# Patient Record
(demographics unavailable — no encounter records)

---

## 2018-09-14 NOTE — NUR
PT ARRIVED TO FLOOR AT 1455. ADMISSION INTAKE COMPLETED. VSS. PT GIVEN
POTASSIUM PO AND IV MAGNESIUM PER ORDER. PT ASSISTED TO ORDER DINNER.

## 2018-09-14 NOTE — NUR
PT CALLED FOR HEARTBURN MEDICATION. NIO-ORDER FOR MAALOX WAS PUT IN. PT STATED
0/10 PAIN. RIGHT RADIAL PULSE IS +2, EDEMA +2. PT DENIES NUMBNESS IN RIGHT ARM
AND HAND. OUTLINE OF CELLULITIS HAS BEEN TRACED WITH A SHARPY TO MONITOR
PROGRESS. NO NEW CONCERNS AT THIS TIME. PT IS TRYING TO SLEEP.

## 2018-09-14 NOTE — XMS
Clinical Summary
  Created on: 2018
 
 Spencer Brunson
 External Reference #: [id number]
 : 69
 Sex: Male
 
 Demographics
 
 
+-----------------------+----------------------+
| Address               | [address] |
|                       | [place]  [address] |
+-----------------------+----------------------+
| Home Phone            | [phone]      |
+-----------------------+----------------------+
| Preferred Language    | Unknown              |
+-----------------------+----------------------+
| Marital Status        | Single               |
+-----------------------+----------------------+
| Gnosticist Affiliation | Unknown              |
+-----------------------+----------------------+
| Race                  | Unknown              |
+-----------------------+----------------------+
| Ethnic Group          | Unknown              |
+-----------------------+----------------------+
 
 
 Author
 
 
+--------------+-----------------------+
| Author       | [facility] |
+--------------+-----------------------+
| Organization | [facility] |
+--------------+-----------------------+
| Address      | Unknown               |
+--------------+-----------------------+
| Phone        | Unavailable           |
+--------------+-----------------------+
 
 
 
 Support
 
 
+------------+--------------+---------+-----------------+
| Name       | Relationship | Address | Phone           |
+------------+--------------+---------+-----------------+
| No,Contact | ECON         | Unknown | [phone] |
+------------+--------------+---------+-----------------+
 
 
 
 Care Team Providers
 
 
 
+---------------------------+------+-----------------+
| Care Team Member Name     | Role | Phone           |
+---------------------------+------+-----------------+
| Dominique Lara PA-C | PP   | [phone] |
+---------------------------+------+-----------------+
 
 
 
 Allergies
 Not on File
 
 Current Medications
 Not on file
 
 Active Problems
 Not on file
 
 Social History
 
 
+----------------+-------+-----------+--------+------+
| Tobacco Use    | Types | Packs/Day | Years  | Date |
|                |       |           | Used   |      |
+----------------+-------+-----------+--------+------+
| Never Assessed |       |           |        |      |
+----------------+-------+-----------+--------+------+
 
 
 
+------------------+---------------+
| Sex Assigned at  | Date Recorded |
| Birth            |               |
+------------------+---------------+
| Not on file      |               |
+------------------+---------------+
 
 
 
 Plan of Treatment
 Not on file
 
 Results
 Not on filefrom Last 3 Months
 
 Insurance
 
 
+-----------------+--------+-------------+------+-------+---------+
| Payer           | Benefi | Subscriber  | Type | Phone | Address |
|                 | t Plan | ID          |      |       |         |
|                 |  /     |             |      |       |         |
|                 | Group  |             |      |       |         |
+-----------------+--------+-------------+------+-------+---------+
| [employer] | ODS    | [id number]   |      |       |         |
|                 | HEALTH |             |      |       |         |
|                 |  PLAN  |             |      |       |         |
+-----------------+--------+-------------+------+-------+---------+
 
 
 
 
+----------------+--------+-------------+--------+-------------+---------------------+
| Guarantor Name | [employer] | Relation to | Date   | Phone       | Billing Address     |
|                | t Type |  Patient    | of     |             |                     |
|                |        |             | Birth  |             |                     |
+----------------+--------+-------------+--------+-------------+---------------------+
| SPENCER BRUNSON     | Person | Self        | / |   Home:     |   [address]  |
|                | [address] |             | 1969   | [phone] | [address]  [place], [place]  |
|                | [place]    |             |        | [phone]        | [address]               |
+----------------+--------+-------------+--------+-------------+---------------------+

## 2018-09-14 NOTE — XMS
Clinical Summary
  Created on: 2018
 
 Spencer Brunson
 External Reference #: [id number]
 : 69
 Sex: Male
 
 Demographics
 
 
+-----------------------+----------------------+
| Address               | [address] |
|                       | [place]  [address] |
+-----------------------+----------------------+
| Home Phone            | [phone]      |
+-----------------------+----------------------+
| Preferred Language    | Unknown              |
+-----------------------+----------------------+
| Marital Status        | Unknown              |
+-----------------------+----------------------+
| Baptism Affiliation | Unknown              |
+-----------------------+----------------------+
| Race                  | Unknown              |
+-----------------------+----------------------+
| Ethnic Group          | Unknown              |
+-----------------------+----------------------+
 
 
 Author
 
 
+--------------+--------------------------------------------+
| Author       | [facility]  |
|              | and [place]                                |
+--------------+--------------------------------------------+
| Organization | [facility]  |
|              | and [place]                                |
+--------------+--------------------------------------------+
| Address      | Unknown                                    |
+--------------+--------------------------------------------+
| Phone        | Unavailable                                |
+--------------+--------------------------------------------+
 
 
 
 Care Team Providers
 
 
+-----------------------+------+-------------+
| Care Team Member Name | Role | Phone       |
+-----------------------+------+-------------+
 PP   | Unavailable |
+-----------------------+------+-------------+
 
 
 
 Allergies
 
 Not on File
 
 Current Medications
 Not on file
 
 Active Problems
 Not on file
 
 Social History
 
 
+----------------+-------+-----------+--------+------+
| Tobacco Use    | Types | Packs/Day | Years  | Date |
|                |       |           | Used   |      |
+----------------+-------+-----------+--------+------+
| Never Assessed |       |           |        |      |
+----------------+-------+-----------+--------+------+
 
 
 
+------------------+---------------+
| Sex Assigned at  | Date Recorded |
| Birth            |               |
+------------------+---------------+
| Not on file      |               |
+------------------+---------------+
 
 
 
 Plan of Treatment
 
 
+---------------------+-----------+-----------+----------+
| Health Maintenance  | Due Date  | Last Done | Comments |
+---------------------+-----------+-----------+----------+
| Vaccine:            |  |           |          |
| Dtap/Tdap/Td (1 -   | 8         |           |          |
| Tdap)               |           |           |          |
+---------------------+-----------+-----------+----------+
| Vaccine: Influenza  |  |           |          |
| (#1)                | 8         |           |          |
+---------------------+-----------+-----------+----------+
 
 
 
 Results
 Not on filefrom Last 3 Months

## 2018-09-14 NOTE — XMS
Clinical Summary
  Created on: 2018
 
 Spencer Brunson
 External Reference #: [id number]
 : 69
 Sex: Male
 
 Demographics
 
 
+-----------------------+----------------------+
| Address               | [address] |
|                       | [place]  [address] |
+-----------------------+----------------------+
| Home Phone            | [phone]      |
+-----------------------+----------------------+
| Preferred Language    | Unknown              |
+-----------------------+----------------------+
| Marital Status        | Unknown              |
+-----------------------+----------------------+
| Anabaptist Affiliation | Unknown              |
+-----------------------+----------------------+
| Race                  | Unknown              |
+-----------------------+----------------------+
| Ethnic Group          | Unknown              |
+-----------------------+----------------------+
 
 
 Author
 
 
+--------------+--------------------------------------------+
| Author       | [facility]  |
|              | and [place]                                |
+--------------+--------------------------------------------+
| Organization | [facility]  |
|              | and [place]                                |
+--------------+--------------------------------------------+
| Address      | Unknown                                    |
+--------------+--------------------------------------------+
| Phone        | Unavailable                                |
+--------------+--------------------------------------------+
 
 
 
 Care Team Providers
 
 
+-----------------------+------+-------------+
| Care Team Member Name | Role | Phone       |
+-----------------------+------+-------------+
 PP   | Unavailable |
+-----------------------+------+-------------+
 
 
 
 Allergies
 
 Not on File
 
 Current Medications
 Not on file
 
 Active Problems
 Not on file
 
 Social History
 
 
+----------------+-------+-----------+--------+------+
| Tobacco Use    | Types | Packs/Day | Years  | Date |
|                |       |           | Used   |      |
+----------------+-------+-----------+--------+------+
| Never Assessed |       |           |        |      |
+----------------+-------+-----------+--------+------+
 
 
 
+------------------+---------------+
| Sex Assigned at  | Date Recorded |
| Birth            |               |
+------------------+---------------+
| Not on file      |               |
+------------------+---------------+
 
 
 
 Plan of Treatment
 
 
+---------------------+-----------+-----------+----------+
| Health Maintenance  | Due Date  | Last Done | Comments |
+---------------------+-----------+-----------+----------+
| Vaccine:            |  |           |          |
| Dtap/Tdap/Td (1 -   | 8         |           |          |
| Tdap)               |           |           |          |
+---------------------+-----------+-----------+----------+
| Vaccine: Influenza  |  |           |          |
| (#1)                | 8         |           |          |
+---------------------+-----------+-----------+----------+
 
 
 
 Results
 Not on filefrom Last 3 Months

## 2018-09-14 NOTE — XMS
Clinical Summary
  Created on: 2018
 
 Spencer Brunson
 External Reference #: [id number]
 : 69
 Sex: Male
 
 Demographics
 
 
+-----------------------+----------------------+
| Address               | [address] |
|                       | [place]  [address] |
+-----------------------+----------------------+
| Home Phone            | [phone]      |
+-----------------------+----------------------+
| Preferred Language    | Unknown              |
+-----------------------+----------------------+
| Marital Status        | Unknown              |
+-----------------------+----------------------+
| Muslim Affiliation | Unknown              |
+-----------------------+----------------------+
| Race                  | Unknown              |
+-----------------------+----------------------+
| Ethnic Group          | Unknown              |
+-----------------------+----------------------+
 
 
 Author
 
 
+--------------+--------------------------------------------+
| Author       | [facility]  |
|              | and [place]                                |
+--------------+--------------------------------------------+
| Organization | [facility]  |
|              | and [place]                                |
+--------------+--------------------------------------------+
| Address      | Unknown                                    |
+--------------+--------------------------------------------+
| Phone        | Unavailable                                |
+--------------+--------------------------------------------+
 
 
 
 Care Team Providers
 
 
+-----------------------+------+-------------+
| Care Team Member Name | Role | Phone       |
+-----------------------+------+-------------+
 PP   | Unavailable |
+-----------------------+------+-------------+
 
 
 
 Allergies
 
 Not on File
 
 Current Medications
 Not on file
 
 Active Problems
 Not on file
 
 Social History
 
 
+----------------+-------+-----------+--------+------+
| Tobacco Use    | Types | Packs/Day | Years  | Date |
|                |       |           | Used   |      |
+----------------+-------+-----------+--------+------+
| Never Assessed |       |           |        |      |
+----------------+-------+-----------+--------+------+
 
 
 
+------------------+---------------+
| Sex Assigned at  | Date Recorded |
| Birth            |               |
+------------------+---------------+
| Not on file      |               |
+------------------+---------------+
 
 
 
 Plan of Treatment
 
 
+---------------------+-----------+-----------+----------+
| Health Maintenance  | Due Date  | Last Done | Comments |
+---------------------+-----------+-----------+----------+
| Vaccine:            |  |           |          |
| Dtap/Tdap/Td (1 -   | 8         |           |          |
| Tdap)               |           |           |          |
+---------------------+-----------+-----------+----------+
| Vaccine: Influenza  |  |           |          |
| (#1)                | 8         |           |          |
+---------------------+-----------+-----------+----------+
 
 
 
 Results
 Not on filefrom Last 3 Months

## 2018-09-14 NOTE — NUR
CHARGE NURSE ROUNDING NOTE: OBSERVATION STATUS AND CONTACT PRECAUTIONS IN
PLACE. AWAKE, WATCHING TV, NO C/O PAIN OR REQUESTS, FLUIDS AND CALL LIGHT AT
BEDSIDE

## 2018-09-14 NOTE — XMS
Clinical Summary
  Created on: 2018
 
 Spencer Brunson
 External Reference #: [id number]
 : 69
 Sex: Male
 
 Demographics
 
 
+-----------------------+----------------------+
| Address               | [address] |
|                       | [place]  [address] |
+-----------------------+----------------------+
| Home Phone            | [phone]      |
+-----------------------+----------------------+
| Preferred Language    | Unknown              |
+-----------------------+----------------------+
| Marital Status        | Single               |
+-----------------------+----------------------+
| Episcopalian Affiliation | Unknown              |
+-----------------------+----------------------+
| Race                  | Unknown              |
+-----------------------+----------------------+
| Ethnic Group          | Unknown              |
+-----------------------+----------------------+
 
 
 Author
 
 
+--------------+-----------------------+
| Author       | [facility] |
+--------------+-----------------------+
| Organization | [facility] |
+--------------+-----------------------+
| Address      | Unknown               |
+--------------+-----------------------+
| Phone        | Unavailable           |
+--------------+-----------------------+
 
 
 
 Support
 
 
+------------+--------------+---------+-----------------+
| Name       | Relationship | Address | Phone           |
+------------+--------------+---------+-----------------+
| No,Contact | ECON         | Unknown | [phone] |
+------------+--------------+---------+-----------------+
 
 
 
 Care Team Providers
 
 
 
+---------------------------+------+-----------------+
| Care Team Member Name     | Role | Phone           |
+---------------------------+------+-----------------+
| Dominique Lara PA-C | PP   | [phone] |
+---------------------------+------+-----------------+
 
 
 
 Allergies
 Not on File
 
 Current Medications
 Not on file
 
 Active Problems
 Not on file
 
 Social History
 
 
+----------------+-------+-----------+--------+------+
| Tobacco Use    | Types | Packs/Day | Years  | Date |
|                |       |           | Used   |      |
+----------------+-------+-----------+--------+------+
| Never Assessed |       |           |        |      |
+----------------+-------+-----------+--------+------+
 
 
 
+------------------+---------------+
| Sex Assigned at  | Date Recorded |
| Birth            |               |
+------------------+---------------+
| Not on file      |               |
+------------------+---------------+
 
 
 
 Plan of Treatment
 Not on file
 
 Results
 Not on filefrom Last 3 Months
 
 Insurance
 
 
+-----------------+--------+-------------+------+-------+---------+
| Payer           | Benefi | Subscriber  | Type | Phone | Address |
|                 | t Plan | ID          |      |       |         |
|                 |  /     |             |      |       |         |
|                 | Group  |             |      |       |         |
+-----------------+--------+-------------+------+-------+---------+
| [employer] | ODS    | [id number]   |      |       |         |
|                 | HEALTH |             |      |       |         |
|                 |  PLAN  |             |      |       |         |
+-----------------+--------+-------------+------+-------+---------+
 
 
 
 
+----------------+--------+-------------+--------+-------------+---------------------+
| Guarantor Name | [employer] | Relation to | Date   | Phone       | Billing Address     |
|                | t Type |  Patient    | of     |             |                     |
|                |        |             | Birth  |             |                     |
+----------------+--------+-------------+--------+-------------+---------------------+
| SPENCER BRUNSON     | Person | Self        | / |   Home:     |   [address]  |
|                | [address] |             | 1969   | [phone] | [address]  [place], [place]  |
|                | [place]    |             |        | [address]        | [address]               |
+----------------+--------+-------------+--------+-------------+---------------------+

## 2018-09-14 NOTE — NUR
RECEIVED REPORT AT 1900, FOUND PT IN BED WITH SPOUSE AT BEDSIDE. PT STATED
ONLY MINIMAL PAIN PRESENT. NO NEW CONCERNS AT THIS TIME.

## 2018-09-14 NOTE — XMS
Clinical Summary
  Created on: 2018
 
 Spencer Brunson
 External Reference #: [id number]
 : 69
 Sex: Male
 
 Demographics
 
 
+-----------------------+----------------------+
| Address               | [address] |
|                       | [place]  [address] |
+-----------------------+----------------------+
| Home Phone            | [phone]      |
+-----------------------+----------------------+
| Preferred Language    | Unknown              |
+-----------------------+----------------------+
| Marital Status        | Single               |
+-----------------------+----------------------+
| Hinduism Affiliation | Unknown              |
+-----------------------+----------------------+
| Race                  | Unknown              |
+-----------------------+----------------------+
| Ethnic Group          | Unknown              |
+-----------------------+----------------------+
 
 
 Author
 
 
+--------------+-----------------------+
| Author       | [facility] |
+--------------+-----------------------+
| Organization | [facility] |
+--------------+-----------------------+
| Address      | Unknown               |
+--------------+-----------------------+
| Phone        | Unavailable           |
+--------------+-----------------------+
 
 
 
 Support
 
 
+------------+--------------+---------+-----------------+
| Name       | Relationship | Address | Phone           |
+------------+--------------+---------+-----------------+
| No,Contact | ECON         | Unknown | [phone] |
+------------+--------------+---------+-----------------+
 
 
 
 Care Team Providers
 
 
 
+---------------------------+------+-----------------+
| Care Team Member Name     | Role | Phone           |
+---------------------------+------+-----------------+
| Dominique Lara PA-C | PP   | [phone] |
+---------------------------+------+-----------------+
 
 
 
 Allergies
 Not on File
 
 Current Medications
 Not on file
 
 Active Problems
 Not on file
 
 Social History
 
 
+----------------+-------+-----------+--------+------+
| Tobacco Use    | Types | Packs/Day | Years  | Date |
|                |       |           | Used   |      |
+----------------+-------+-----------+--------+------+
| Never Assessed |       |           |        |      |
+----------------+-------+-----------+--------+------+
 
 
 
+------------------+---------------+
| Sex Assigned at  | Date Recorded |
| Birth            |               |
+------------------+---------------+
| Not on file      |               |
+------------------+---------------+
 
 
 
 Plan of Treatment
 Not on file
 
 Results
 Not on filefrom Last 3 Months
 
 Insurance
 
 
+-----------------+--------+-------------+------+-------+---------+
| Payer           | Benefi | Subscriber  | Type | Phone | Address |
|                 | t Plan | ID          |      |       |         |
|                 |  /     |             |      |       |         |
|                 | Group  |             |      |       |         |
+-----------------+--------+-------------+------+-------+---------+
| [employer] | ODS    | [id number]   |      |       |         |
|                 | HEALTH |             |      |       |         |
|                 |  PLAN  |             |      |       |         |
+-----------------+--------+-------------+------+-------+---------+
 
 
 
 
+----------------+--------+-------------+--------+-------------+---------------------+
| Guarantor Name | [employer] | Relation to | Date   | Phone       | Billing Address     |
|                | t Type |  Patient    | of     |             |                     |
|                |        |             | Birth  |             |                     |
+----------------+--------+-------------+--------+-------------+---------------------+
| SPENCER BRUNSON     | Person | Self        | / |   Home:     |   [address]  |
|                | [address]am |             | 1969   | [phone] | [address]  [place], [place]  |
|                | [place]    |             |        | [address]        | [address]               |
+----------------+--------+-------------+--------+-------------+---------------------+

## 2018-09-14 NOTE — NUR
PT IS AWAKE IN BED WATCHING TV. PT DENIES PAIN AT THIS TIME. PT HAS NO NEEDS
OR CONCERNS AT THIS TIME.

## 2018-09-14 NOTE — NUR
MED REC COMPLETE WITH [facility] REFILL HISTORY. PATIENT RECENTLY 9/9/18 FILLED
FOR PERMETHRIN. PATIENT FILLS LISINOPRIL-HCTZ APPROXIMATELY EVERY 60 DAYS FOR
A 30 DAY SUPPLY.

## 2018-09-15 NOTE — NUR
PT RESTING IN BED, ALERT AND ORIENTED X3, PT DENIES NEEDS. CALL LIGHT IN
REACH. RIGHT ARM REMAINS ELEVATED ON PILLOW.

## 2018-09-15 NOTE — NUR
CONTACT [facility] FOR RECENT HX SCABIES. FEBRILE TODAY. INDEPENDENT IN ROOM.
SHOWERED TODAY. TYLENOL X1. NORCO X1. SALINE LOCKED. CLINDAMYCIN. REGULAR
DIET. POSSIBLE HEP C.

## 2018-09-15 NOTE — NUR
V/S ARE WDL, RIGHT ARM EDEMA IS +2 BUT PT STATED IT IS MUCH BETTER. REDNESS
HAS ALSO RECEIDED AS STATED BY PATIENT. AT START OF SHIFT PT HAD SOME NUMBNESS
IN RIGHT HANDS AND FINGERS WITH RADIAL PULE +2. AT THIS TIME PT DENIES
NUMBNESS IN HAND AND FINGERS AND RADIAL PULSE IS STILL +2.
PT AT START OF SHIFT WAS AFEBRILE.
TEMP AT 0145 .0. AT THIS TIME TEMP IS 99.9. WILL CONTINUE TO MONITOR.
PAIN IS WELL CONTROLLED WITH PRN PAIN MEDS. ALL LOBES ARE CLEAR, ABD SOUNDS
ARE PRESENT AND NO OTHER EDEMA HAS BEEN NOTED, PT IS AAO X4. REDNESS ON RIGHT
ARM HAS BEEN TRACED WITH SHARPY.

## 2018-09-15 NOTE — NUR
CHARGE RN ROUNDING. PT'S PRIMARY RN IN ROOM. PT REQUESTING MORE COLA,
PROVIDED. PT DENIES OTHER NEEDS AT THIS TIME. CALL LIGHT WITHIN REACH.

## 2018-09-15 NOTE — NUR
PT WAS WOKEN UP FOR MEDICATION, V/S AND ANOTHER ASSESSMENT. PT AT THIS TIME
HAS A TEMP .0 F. PAIN IS 0/10. PT IS BACK TO SLEEP. WILL CONTINUE TO
MONITOR.

## 2018-09-15 NOTE — NUR
PT RESTING IN BED WATCHING TV. ASSESSMENT COMPLETED. CALL LIGHT AND H20 IN
REACH. PT DENIES NEEDS AT THIS TIME.
 
 
 
 
 
 
 
 
 
 
 
 
 
 
 
 
 
 
 
 
 
 
 
 
 
 
 
 
 
 
 
 
 
 
 
 
 
 
 
 
 
 
.

## 2018-09-15 NOTE — NUR
BEDSIDE REPORT RECEIVED FROM BLAKE CARTWRIGHT. WHITE BOARD UPDATED. CONTACT CART
OUTSIDE ROOM. CONTACT ISOLATION FOR HX OF SCABIES RECENTLY. PT AWAKE UPON
ENTERING ROOM. BREAKFAST ORDERED. SALINE LOCKED. RIGHT ARM REDNESS MARKED.
WBCs NOT IMPROVED OVERNIGHT. FEBRILE OVERNIGHT AS WELL. CHARGE NURSE NOTIFIED
OF CONCERN FOR CHANGE IN PLAN OF CARE FOR INFECTION. PATIENT SET UP FOR
SHOWER. WARM BLANKETS PROVIDED. PT HAVING CHILLS THIS MORNING.

## 2018-09-16 NOTE — NUR
IN TO SEE PATIENT. PT REPORTS 8/10 PAIN TO RIGHT ARM AND A HEADACHE THAT HE
STATES IS THE SAME HEADACHE HE SUFFERS FROM ON RARE OCCASIONS. ASSESSMENT
COMPLETED AND PT REQUESTED AND RECEIVED PRN PO NORCO/10/325MG. CALL LIGHT IN
REACH AND FRESH ICE WATER PROVIDED.

## 2018-09-16 NOTE — NUR
PATIENT WAS UP IN ROOM, USING THE RESTROOM, BREAKFAST ARRIVED, HE ASKED FOR
MORE COFFEE, WENT ON A WALK, AND IS CURRENTLY EATING BREAKFAST, READY TO GO
HOME

## 2018-09-16 NOTE — NUR
PT RESTING IN BED, EYES CLOSED AND RESPIRATIONS EVEN AND UNLABORED. CALL LIGHT
AND H20 IN REACH. PT APPEARS TO BE SLEEPING COMFORTABLY.

## 2018-09-16 NOTE — NUR
PT PLEASANT, CALM AND COOPERATIVE WITH SOME C/O DISCOMFORT IN RIGHT ARM BUT
STATES THAT IT IS TOLERABLE- TYLENOL GIVEN TO HELP MANAGE DISCOMFORT.
ASSESSMENT COMPLETED AND MEDS GIVEN. ROOM AIR. NO NUMBNESS IN RIGHT HAND,
PULSES STRONG AND CAP REFILL <3 SEC. ARM IS WARM AND RED BUT REDNESS HAS
DECREASED SINCE THIS RN HAD HIM ON FRIDAY EVENING. INDEPENDENT IN ROOM, STRONG
AND STEADY ON FEET. LAST BM 13TH. PT HAS NO QUESTIONS OR CONCERNS. CALL LIGHT
WITHIN REACH. WILL CONTINUE TO MONITOR.

## 2018-09-16 NOTE — NUR
PT A/O X4, REMAINS ON CONTACT ISOLATION FOR RECENT HX OF SCABIES. PT HAS BEEN
AFEBRILE TONIGHT AND HAS EXPRESSED HIS DESIRE TO BE DISCHARGED TODAY. PT IS
INDEPENDANT IN ROOM. PT DID EXPERIENCE 8/10 PAIN THIS MORNING AND WAS GIVEN
10/325 TABLET OF NORCO PO. SALINE LOCKED WITH SCHEDULED IV CLINDAMYCIN.
REGULAR DIET. AFEBRILE THIS SHIFT "I AM FEELING MUCH BETTER, IT HELPED THAT I
WAS ABLE TO GET SOME SLEEP AND MY ARM LOOKS AND FEELS MUCH BETTER. I HOPE
THIS MEANS I GET TO GO HOME TODAY".

## 2018-09-16 NOTE — NUR
PT SITTING COMFORTABLY IN ROOM WATCHING TV. AWAITING DISCHARGE. MINIMAL PAIN
AFTER TYLENOL. NO QUESTIONS OR CONCERNS. CALL LIGHT WITHIN REACH. WILL
CONTINUE TO MONITOR AND BE ABLE TO DC SHORLY.

## 2018-09-20 NOTE — XMS
Clinical Summary
  Created on: 2018
 
 Spencer Brunson
 External Reference #: [id number]
 : 69
 Sex: Male
 
 Demographics
 
 
+-----------------------+----------------------+
| Address               | [address] |
|                       | [place]  [address] |
+-----------------------+----------------------+
| Home Phone            | [phone]      |
+-----------------------+----------------------+
| Preferred Language    | Unknown              |
+-----------------------+----------------------+
| Marital Status        | Unknown              |
+-----------------------+----------------------+
| Mormonism Affiliation | Unknown              |
+-----------------------+----------------------+
| Race                  | Unknown              |
+-----------------------+----------------------+
| Ethnic Group          | Unknown              |
+-----------------------+----------------------+
 
 
 Author
 
 
+--------------+--------------------------------------------+
| Author       | [facility]  |
|              | and [place]                                |
+--------------+--------------------------------------------+
| Organization | [facility]  |
|              | and [place]                                |
+--------------+--------------------------------------------+
| Address      | Unknown                                    |
+--------------+--------------------------------------------+
| Phone        | Unavailable                                |
+--------------+--------------------------------------------+
 
 
 
 Care Team Providers
 
 
+-----------------------+------+-------------+
| Care Team Member Name | Role | Phone       |
+-----------------------+------+-------------+
 PP   | Unavailable |
+-----------------------+------+-------------+
 
 
 
 Allergies
 
 Not on File
 
 Current Medications
 Not on file
 
 Active Problems
 Not on file
 
 Social History
 
 
+----------------+-------+-----------+--------+------+
| Tobacco Use    | Types | Packs/Day | Years  | Date |
|                |       |           | Used   |      |
+----------------+-------+-----------+--------+------+
| Never Assessed |       |           |        |      |
+----------------+-------+-----------+--------+------+
 
 
 
+------------------+---------------+
| Sex Assigned at  | Date Recorded |
| Birth            |               |
+------------------+---------------+
| Not on file      |               |
+------------------+---------------+
 
 
 
 Plan of Treatment
 
 
+---------------------+-----------+-----------+----------+
| Health Maintenance  | Due Date  | Last Done | Comments |
+---------------------+-----------+-----------+----------+
| Vaccine:            |  |           |          |
| Dtap/Tdap/Td (1 -   | 8         |           |          |
| Tdap)               |           |           |          |
+---------------------+-----------+-----------+----------+
| Vaccine: Influenza  |  |           |          |
| (#1)                | 8         |           |          |
+---------------------+-----------+-----------+----------+
 
 
 
 Results
 Not on filefrom Last 3 Months

## 2018-09-20 NOTE — XMS
Clinical Summary
  Created on: 2018
 
 Spencer Brunson
 External Reference #: [id number]
 : 69
 Sex: Male
 
 Demographics
 
 
+-----------------------+----------------------+
| Address               | [address] |
|                       | [place]  [address] |
+-----------------------+----------------------+
| Home Phone            | [phone]      |
+-----------------------+----------------------+
| Preferred Language    | Unknown              |
+-----------------------+----------------------+
| Marital Status        | Single               |
+-----------------------+----------------------+
| Mosque Affiliation | Unknown              |
+-----------------------+----------------------+
| Race                  | Unknown              |
+-----------------------+----------------------+
| Ethnic Group          | Unknown              |
+-----------------------+----------------------+
 
 
 Author
 
 
+--------------+-----------------------+
| Author       | [facility] |
+--------------+-----------------------+
| Organization | [facility] |
+--------------+-----------------------+
| Address      | Unknown               |
+--------------+-----------------------+
| Phone        | Unavailable           |
+--------------+-----------------------+
 
 
 
 Support
 
 
+------------+--------------+---------+-----------------+
| Name       | Relationship | Address | Phone           |
+------------+--------------+---------+-----------------+
| No,Contact | ECON         | Unknown | [phone] |
+------------+--------------+---------+-----------------+
 
 
 
 Care Team Providers
 
 
 
+---------------------------+------+-----------------+
| Care Team Member Name     | Role | Phone           |
+---------------------------+------+-----------------+
| Dominique Lara PA-C | PP   | [phone] |
+---------------------------+------+-----------------+
 
 
 
 Allergies
 Not on File
 
 Current Medications
 Not on file
 
 Active Problems
 Not on file
 
 Social History
 
 
+----------------+-------+-----------+--------+------+
| Tobacco Use    | Types | Packs/Day | Years  | Date |
|                |       |           | Used   |      |
+----------------+-------+-----------+--------+------+
| Never Assessed |       |           |        |      |
+----------------+-------+-----------+--------+------+
 
 
 
+------------------+---------------+
| Sex Assigned at  | Date Recorded |
| Birth            |               |
+------------------+---------------+
| Not on file      |               |
+------------------+---------------+
 
 
 
 Plan of Treatment
 Not on file
 
 Results
 Not on filefrom Last 3 Months
 
 Insurance
 
 
+-----------------+--------+-------------+------+-------+---------+
| Payer           | Benefi | Subscriber  | Type | Phone | Address |
|                 | t Plan | ID          |      |       |         |
|                 |  /     |             |      |       |         |
|                 | Group  |             |      |       |         |
+-----------------+--------+-------------+------+-------+---------+
| [employer] | ODS    | [id number]   |      |       |         |
|                 | HEALTH |             |      |       |         |
|                 |  PLAN  |             |      |       |         |
+-----------------+--------+-------------+------+-------+---------+
 
 
 
 
+----------------+--------+-------------+--------+-------------+---------------------+
| Guarantor Name | [employer] | Relation to | Date   | Phone       | Billing Address     |
|                | t Type |  Patient    | of     |             |                     |
|                |        |             | Birth  |             |                     |
+----------------+--------+-------------+--------+-------------+---------------------+
| SPENCER BRUNSON     | Person | Self        | / |   Home:     |   [address]  |
|                | [address]Fam |             | 1969   | [phone] | [address]  [place], [place]  |
|                | [place]    |             |        | [id number]        | [id number]               |
+----------------+--------+-------------+--------+-------------+---------------------+

## 2018-09-20 NOTE — XMS
Clinical Summary
  Created on: 2018
 
 Spencer Brunson
 External Reference #: [id number]
 : 69
 Sex: Male
 
 Demographics
 
 
+-----------------------+----------------------+
| Address               | [address] |
|                       | [place]  [address] |
+-----------------------+----------------------+
| Home Phone            | [phone]      |
+-----------------------+----------------------+
| Preferred Language    | Unknown              |
+-----------------------+----------------------+
| Marital Status        | Unknown              |
+-----------------------+----------------------+
| Episcopalian Affiliation | Unknown              |
+-----------------------+----------------------+
| Race                  | Unknown              |
+-----------------------+----------------------+
| Ethnic Group          | Unknown              |
+-----------------------+----------------------+
 
 
 Author
 
 
+--------------+--------------------------------------------+
| Author       | [facility]  |
|              | and [place]                                |
+--------------+--------------------------------------------+
| Organization | [facility]  |
|              | and [place]                                |
+--------------+--------------------------------------------+
| Address      | Unknown                                    |
+--------------+--------------------------------------------+
| Phone        | Unavailable                                |
+--------------+--------------------------------------------+
 
 
 
 Care Team Providers
 
 
+-----------------------+------+-------------+
| Care Team Member Name | Role | Phone       |
+-----------------------+------+-------------+
 PP   | Unavailable |
+-----------------------+------+-------------+
 
 
 
 Allergies
 
 Not on File
 
 Current Medications
 Not on file
 
 Active Problems
 Not on file
 
 Social History
 
 
+----------------+-------+-----------+--------+------+
| Tobacco Use    | Types | Packs/Day | Years  | Date |
|                |       |           | Used   |      |
+----------------+-------+-----------+--------+------+
| Never Assessed |       |           |        |      |
+----------------+-------+-----------+--------+------+
 
 
 
+------------------+---------------+
| Sex Assigned at  | Date Recorded |
| Birth            |               |
+------------------+---------------+
| Not on file      |               |
+------------------+---------------+
 
 
 
 Plan of Treatment
 
 
+---------------------+-----------+-----------+----------+
| Health Maintenance  | Due Date  | Last Done | Comments |
+---------------------+-----------+-----------+----------+
| Vaccine:            |  |           |          |
| Dtap/Tdap/Td (1 -   | 8         |           |          |
| Tdap)               |           |           |          |
+---------------------+-----------+-----------+----------+
| Vaccine: Influenza  |  |           |          |
| (#1)                | 8         |           |          |
+---------------------+-----------+-----------+----------+
 
 
 
 Results
 Not on filefrom Last 3 Months

## 2018-09-20 NOTE — XMS
Clinical Summary
  Created on: 2018
 
 Spencer Brunson
 External Reference #: [id number]
 : 69
 Sex: Male
 
 Demographics
 
 
+-----------------------+----------------------+
| Address               | [address] |
|                       | [place]  [address] |
+-----------------------+----------------------+
| Home Phone            | [phone]      |
+-----------------------+----------------------+
| Preferred Language    | Unknown              |
+-----------------------+----------------------+
| Marital Status        | Single               |
+-----------------------+----------------------+
| Zoroastrianism Affiliation | Unknown              |
+-----------------------+----------------------+
| Race                  | Unknown              |
+-----------------------+----------------------+
| Ethnic Group          | Unknown              |
+-----------------------+----------------------+
 
 
 Author
 
 
+--------------+-----------------------+
| Author       | [facility] |
+--------------+-----------------------+
| Organization | [facility] |
+--------------+-----------------------+
| Address      | Unknown               |
+--------------+-----------------------+
| Phone        | Unavailable           |
+--------------+-----------------------+
 
 
 
 Support
 
 
+------------+--------------+---------+-----------------+
| Name       | Relationship | Address | Phone           |
+------------+--------------+---------+-----------------+
| No,Contact | ECON         | Unknown | [phone] |
+------------+--------------+---------+-----------------+
 
 
 
 Care Team Providers
 
 
 
+---------------------------+------+-----------------+
| Care Team Member Name     | Role | Phone           |
+---------------------------+------+-----------------+
| Dominique Lara PA-C | PP   | [phone] |
+---------------------------+------+-----------------+
 
 
 
 Allergies
 Not on File
 
 Current Medications
 Not on file
 
 Active Problems
 Not on file
 
 Social History
 
 
+----------------+-------+-----------+--------+------+
| Tobacco Use    | Types | Packs/Day | Years  | Date |
|                |       |           | Used   |      |
+----------------+-------+-----------+--------+------+
| Never Assessed |       |           |        |      |
+----------------+-------+-----------+--------+------+
 
 
 
+------------------+---------------+
| Sex Assigned at  | Date Recorded |
| Birth            |               |
+------------------+---------------+
| Not on file      |               |
+------------------+---------------+
 
 
 
 Plan of Treatment
 Not on file
 
 Results
 Not on filefrom Last 3 Months
 
 Insurance
 
 
+-----------------+--------+-------------+------+-------+---------+
| Payer           | Benefi | Subscriber  | Type | Phone | Address |
|                 | t Plan | ID          |      |       |         |
|                 |  /     |             |      |       |         |
|                 | Group  |             |      |       |         |
+-----------------+--------+-------------+------+-------+---------+
| [employer] | ODS    | [id number]   |      |       |         |
|                 | HEALTH |             |      |       |         |
|                 |  PLAN  |             |      |       |         |
+-----------------+--------+-------------+------+-------+---------+
 
 
 
 
+----------------+--------+-------------+--------+-------------+---------------------+
| Guarantor Name | [employer] | Relation to | Date   | Phone       | Billing Address     |
|                | t Type |  Patient    | of     |             |                     |
|                |        |             | Birth  |             |                     |
+----------------+--------+-------------+--------+-------------+---------------------+
| SPENCER BRUNSON     | Person | Self        | / |   Home:     |   [address]  |
|                | [address] |             | 1969   | [phone] | [address]  [place], [place]  |
|                | [place]    |             |        | [address]        | [address]               |
+----------------+--------+-------------+--------+-------------+---------------------+

## 2018-09-20 NOTE — XMS
PreManage Notification: KYRIE KRAFT MRN:[id number]
 
Security Information
 
Security Events
No recent Security Events currently on file
 
 
 
CRITERIA MET
------------
- [facility] - 2 Visits in 30 Days
 
 
CARE PROVIDERS
-------------------------------------------------------------------------------------
Dominique Lara     Primary Care     Current
PAC
 
PHONE: Unknown
-------------------------------------------------------------------------------------
 
[facility] has no Care Guidelines for this patient.
 
E.HANS VISIT COUNT (12 MO.)
-------------------------------------------------------------------------------------
2 [facility]
-------------------------------------------------------------------------------------
TOTAL 2
-------------------------------------------------------------------------------------
NOTE: Visits indicate total known visits.
 
ED/UCC VISIT TRACKING (12 MO.)
-------------------------------------------------------------------------------------
09/20/2018 07:31
[facility] [place]
 
TYPE: Emergency
 
COMPLAINT:
- R ELBOW PAIN/SWELLING/NO INJURY
-------------------------------------------------------------------------------------
09/14/2018 11:10
[facility] [place]
 
TYPE: Emergency
 
COMPLAINT:
- R ELBOW PAIN/INJURY
-------------------------------------------------------------------------------------
 
 
INPATIENT VISIT TRACKING (12 MO.)
No inpatient visits to display in this time frame
 
[email or web address]

## 2018-09-21 NOTE — XMS
Clinical Summary
  Created on: 2018
 
 Spencer Brunson
 External Reference #: [id number]
 : 69
 Sex: Male
 
 Demographics
 
 
+-----------------------+----------------------+
| Address               | [address] |
|                       | [place]  [address] |
+-----------------------+----------------------+
| Home Phone            | [phone]      |
+-----------------------+----------------------+
| Preferred Language    | Unknown              |
+-----------------------+----------------------+
| Marital Status        | Unknown              |
+-----------------------+----------------------+
| Hinduism Affiliation | Unknown              |
+-----------------------+----------------------+
| Race                  | Unknown              |
+-----------------------+----------------------+
| Ethnic Group          | Unknown              |
+-----------------------+----------------------+
 
 
 Author
 
 
+--------------+--------------------------------------------+
| Author       | [facility]  |
|              | and [place]                                |
+--------------+--------------------------------------------+
| Organization | [facility]  |
|              | and [place]                                |
+--------------+--------------------------------------------+
| Address      | Unknown                                    |
+--------------+--------------------------------------------+
| Phone        | Unavailable                                |
+--------------+--------------------------------------------+
 
 
 
 Care Team Providers
 
 
+-----------------------+------+-------------+
| Care Team Member Name | Role | Phone       |
+-----------------------+------+-------------+
 PP   | Unavailable |
+-----------------------+------+-------------+
 
 
 
 Allergies
 
 Not on File
 
 Current Medications
 Not on file
 
 Active Problems
 Not on file
 
 Social History
 
 
+----------------+-------+-----------+--------+------+
| Tobacco Use    | Types | Packs/Day | Years  | Date |
|                |       |           | Used   |      |
+----------------+-------+-----------+--------+------+
| Never Assessed |       |           |        |      |
+----------------+-------+-----------+--------+------+
 
 
 
+------------------+---------------+
| Sex Assigned at  | Date Recorded |
| Birth            |               |
+------------------+---------------+
| Not on file      |               |
+------------------+---------------+
 
 
 
 Plan of Treatment
 
 
+---------------------+-----------+-----------+----------+
| Health Maintenance  | Due Date  | Last Done | Comments |
+---------------------+-----------+-----------+----------+
| Vaccine:            |  |           |          |
| Dtap/Tdap/Td (1 -   | 8         |           |          |
| Tdap)               |           |           |          |
+---------------------+-----------+-----------+----------+
| Vaccine: Influenza  |  |           |          |
| (#1)                | 8         |           |          |
+---------------------+-----------+-----------+----------+
 
 
 
 Results
 Not on filefrom Last 3 Months

## 2018-09-21 NOTE — XMS
PreManage Notification: KYRIE KRAFT MRN:[id number]
 
Security Information
 
Security Events
No recent Security Events currently on file
 
 
 
CRITERIA MET
------------
- [facility] - 2 Visits in 30 Days
 
 
CARE PROVIDERS
-------------------------------------------------------------------------------------
Dominique Lara     Primary Care     Current
PAC
 
PHONE: Unknown
-------------------------------------------------------------------------------------
 
Celeste has no Care Guidelines for this patient.
 
E.HANS VISIT COUNT (12 MO.)
-------------------------------------------------------------------------------------
3 [facility]
-------------------------------------------------------------------------------------
TOTAL 3
-------------------------------------------------------------------------------------
NOTE: Visits indicate total known visits.
 
ED/UCC VISIT TRACKING (12 MO.)
-------------------------------------------------------------------------------------
09/21/2018 07:41
[facility] [place]
 
TYPE: Emergency
 
COMPLAINT:
- EXTREMITY PAIN
-------------------------------------------------------------------------------------
09/20/2018 07:31
[facility] [place]
 
TYPE: Emergency
 
COMPLAINT:
- R ELBOW PAIN/SWELLING/NO INJURY
-------------------------------------------------------------------------------------
09/14/2018 11:10
[facility] [place]
 
TYPE: Emergency
 
COMPLAINT:
- R ELBOW PAIN/INJURY
-------------------------------------------------------------------------------------
 
 
 
INPATIENT VISIT TRACKING (12 MO.)
No inpatient visits to display in this time frame
 
[email or web address]

## 2018-09-21 NOTE — XMS
Clinical Summary
  Created on: 2018
 
 Spencer Brunson
 External Reference #: [id number]
 : 69
 Sex: Male
 
 Demographics
 
 
+-----------------------+----------------------+
| Address               | [address] |
|                       | [place]  [address] |
+-----------------------+----------------------+
| Home Phone            | [phone]      |
+-----------------------+----------------------+
| Preferred Language    | Unknown              |
+-----------------------+----------------------+
| Marital Status        | Single               |
+-----------------------+----------------------+
| Methodist Affiliation | Unknown              |
+-----------------------+----------------------+
| Race                  | Unknown              |
+-----------------------+----------------------+
| Ethnic Group          | Unknown              |
+-----------------------+----------------------+
 
 
 Author
 
 
+--------------+-----------------------+
| Author       | [facility] |
+--------------+-----------------------+
| Organization | [facility] |
+--------------+-----------------------+
| Address      | Unknown               |
+--------------+-----------------------+
| Phone        | Unavailable           |
+--------------+-----------------------+
 
 
 
 Support
 
 
+------------+--------------+---------+-----------------+
| Name       | Relationship | Address | Phone           |
+------------+--------------+---------+-----------------+
| No,Contact | ECON         | Unknown | [phone] |
+------------+--------------+---------+-----------------+
 
 
 
 Care Team Providers
 
 
 
+---------------------------+------+-----------------+
| Care Team Member Name     | Role | Phone           |
+---------------------------+------+-----------------+
| Dominique Lara PA-C | PP   | [phone] |
+---------------------------+------+-----------------+
 
 
 
 Allergies
 Not on File
 
 Current Medications
 Not on file
 
 Active Problems
 Not on file
 
 Social History
 
 
+----------------+-------+-----------+--------+------+
| Tobacco Use    | Types | Packs/Day | Years  | Date |
|                |       |           | Used   |      |
+----------------+-------+-----------+--------+------+
| Never Assessed |       |           |        |      |
+----------------+-------+-----------+--------+------+
 
 
 
+------------------+---------------+
| Sex Assigned at  | Date Recorded |
| Birth            |               |
+------------------+---------------+
| Not on file      |               |
+------------------+---------------+
 
 
 
 Plan of Treatment
 Not on file
 
 Results
 Not on filefrom Last 3 Months
 
 Insurance
 
 
+-----------------+--------+-------------+------+-------+---------+
| Payer           | Benefi | Subscriber  | Type | Phone | Address |
|                 | t Plan | ID          |      |       |         |
|                 |  /     |             |      |       |         |
|                 | Group  |             |      |       |         |
+-----------------+--------+-------------+------+-------+---------+
| [employer] | ODS    | [id number]   |      |       |         |
|                 | HEALTH |             |      |       |         |
|                 |  PLAN  |             |      |       |         |
+-----------------+--------+-------------+------+-------+---------+
 
 
 
 
+----------------+--------+-------------+--------+-------------+---------------------+
| Guarantor Name | [employer] | Relation to | Date   | Phone       | Billing Address     |
|                | t Type |  Patient    | of     |             |                     |
|                |        |             | Birth  |             |                     |
+----------------+--------+-------------+--------+-------------+---------------------+
| SPENCER BRUNSON     | Person | Self        | / |   Home:     |   [address]  |
|                | [address]/Fam |             | 1969   | [phone] | Ave  [place], [place]  |
|                | [place]    |             |        | [id number]        | [id number]               |
+----------------+--------+-------------+--------+-------------+---------------------+

## 2018-09-21 NOTE — XMS
Clinical Summary
  Created on: 2018
 
 Spencer Brunson
 External Reference #: [id number]
 : 69
 Sex: Male
 
 Demographics
 
 
+-----------------------+----------------------+
| Address               | [address] |
|                       | [place]  [address] |
+-----------------------+----------------------+
| Home Phone            | [phone]      |
+-----------------------+----------------------+
| Preferred Language    | Unknown              |
+-----------------------+----------------------+
| Marital Status        | Unknown              |
+-----------------------+----------------------+
| Yazidism Affiliation | Unknown              |
+-----------------------+----------------------+
| Race                  | Unknown              |
+-----------------------+----------------------+
| Ethnic Group          | Unknown              |
+-----------------------+----------------------+
 
 
 Author
 
 
+--------------+--------------------------------------------+
| Author       | [facility]  |
|              | and [place]                                |
+--------------+--------------------------------------------+
| Organization | [facility]  |
|              | and [place]                                |
+--------------+--------------------------------------------+
| Address      | Unknown                                    |
+--------------+--------------------------------------------+
| Phone        | Unavailable                                |
+--------------+--------------------------------------------+
 
 
 
 Care Team Providers
 
 
+-----------------------+------+-------------+
| Care Team Member Name | Role | Phone       |
+-----------------------+------+-------------+
 PP   | Unavailable |
+-----------------------+------+-------------+
 
 
 
 Allergies
 
 Not on File
 
 Current Medications
 Not on file
 
 Active Problems
 Not on file
 
 Social History
 
 
+----------------+-------+-----------+--------+------+
| Tobacco Use    | Types | Packs/Day | Years  | Date |
|                |       |           | Used   |      |
+----------------+-------+-----------+--------+------+
| Never Assessed |       |           |        |      |
+----------------+-------+-----------+--------+------+
 
 
 
+------------------+---------------+
| Sex Assigned at  | Date Recorded |
| Birth            |               |
+------------------+---------------+
| Not on file      |               |
+------------------+---------------+
 
 
 
 Plan of Treatment
 
 
+---------------------+-----------+-----------+----------+
| Health Maintenance  | Due Date  | Last Done | Comments |
+---------------------+-----------+-----------+----------+
| Vaccine:            |  |           |          |
| Dtap/Tdap/Td (1 -   | 8         |           |          |
| Tdap)               |           |           |          |
+---------------------+-----------+-----------+----------+
| Vaccine: Influenza  |  |           |          |
| (#1)                | 8         |           |          |
+---------------------+-----------+-----------+----------+
 
 
 
 Results
 Not on filefrom Last 3 Months

## 2018-09-21 NOTE — XMS
Clinical Summary
  Created on: 2018
 
 Spencer Brunson
 External Reference #: [id number]
 : 69
 Sex: Male
 
 Demographics
 
 
+-----------------------+----------------------+
| Address               | [address] |
|                       | [place]  [address] |
+-----------------------+----------------------+
| Home Phone            | [phone]      |
+-----------------------+----------------------+
| Preferred Language    | Unknown              |
+-----------------------+----------------------+
| Marital Status        | Single               |
+-----------------------+----------------------+
| Sabianism Affiliation | Unknown              |
+-----------------------+----------------------+
| Race                  | Unknown              |
+-----------------------+----------------------+
| Ethnic Group          | Unknown              |
+-----------------------+----------------------+
 
 
 Author
 
 
+--------------+-----------------------+
| Author       | [email or web address] |
+--------------+-----------------------+
| Organization | [email or web address] |
+--------------+-----------------------+
| Address      | Unknown               |
+--------------+-----------------------+
| Phone        | Unavailable           |
+--------------+-----------------------+
 
 
 
 Support
 
 
+------------+--------------+---------+-----------------+
| Name       | Relationship | Address | Phone           |
+------------+--------------+---------+-----------------+
| No,Contact | ECON         | Unknown | [phone] |
+------------+--------------+---------+-----------------+
 
 
 
 Care Team Providers
 
 
 
+---------------------------+------+-----------------+
| Care Team Member Name     | Role | Phone           |
+---------------------------+------+-----------------+
| Dominique Lara PA-C | PP   | [phone] |
+---------------------------+------+-----------------+
 
 
 
 Allergies
 Not on File
 
 Current Medications
 Not on file
 
 Active Problems
 Not on file
 
 Social History
 
 
+----------------+-------+-----------+--------+------+
| Tobacco Use    | Types | Packs/Day | Years  | Date |
|                |       |           | Used   |      |
+----------------+-------+-----------+--------+------+
| Never Assessed |       |           |        |      |
+----------------+-------+-----------+--------+------+
 
 
 
+------------------+---------------+
| Sex Assigned at  | Date Recorded |
| Birth            |               |
+------------------+---------------+
| Not on file      |               |
+------------------+---------------+
 
 
 
 Plan of Treatment
 Not on file
 
 Results
 Not on filefrom Last 3 Months
 
 Insurance
 
 
+-----------------+--------+-------------+------+-------+---------+
| Payer           | Benefi | Subscriber  | Type | Phone | Address |
|                 | t Plan | ID          |      |       |         |
|                 |  /     |             |      |       |         |
|                 | Group  |             |      |       |         |
+-----------------+--------+-------------+------+-------+---------+
| [employer] | ODS    | [id number]   |      |       |         |
|                 | HEALTH |             |      |       |         |
|                 |  PLAN  |             |      |       |         |
+-----------------+--------+-------------+------+-------+---------+
 
 
 
 
+----------------+--------+-------------+--------+-------------+---------------------+
| Guarantor Name | [employer] | Relation to | Date   | Phone       | Billing Address     |
|                | t Type |  Patient    | of     |             |                     |
|                |        |             | Birth  |             |                     |
+----------------+--------+-------------+--------+-------------+---------------------+
| SPENCER BRUNSON     | Person | Self        | / |   Home:     |   [address]  |
|                | [address] |             | 1969   | [phone] | Ave  [place], [place]  |
|                | [place]    |             |        | [address]        | [address]               |
+----------------+--------+-------------+--------+-------------+---------------------+

## 2018-09-22 NOTE — XMS
Clinical Summary
  Created on: 2018
 
 Spencer Brunson
 External Reference #: [id number]
 : 69
 Sex: Male
 
 Demographics
 
 
+-----------------------+----------------------+
| Address               | [address] |
|                       | [place]  [address] |
+-----------------------+----------------------+
| Home Phone            | [phone]      |
+-----------------------+----------------------+
| Preferred Language    | Unknown              |
+-----------------------+----------------------+
| Marital Status        | Single               |
+-----------------------+----------------------+
| Amish Affiliation | Unknown              |
+-----------------------+----------------------+
| Race                  | Unknown              |
+-----------------------+----------------------+
| Ethnic Group          | Unknown              |
+-----------------------+----------------------+
 
 
 Author
 
 
+--------------+-----------------------+
| Author       | [facility] [employer] [employer] [facility] |
+--------------+-----------------------+
| Organization | [facility] |
+--------------+-----------------------+
| Address      | Unknown               |
+--------------+-----------------------+
| Phone        | Unavailable           |
+--------------+-----------------------+
 
 
 
 Support
 
 
+------------+--------------+---------+-----------------+
| Name       | Relationship | Address | Phone           |
+------------+--------------+---------+-----------------+
| No,Contact | ECON         | Unknown | [phone] |
+------------+--------------+---------+-----------------+
 
 
 
 Care Team Providers
 
 
 
+---------------------------+------+-----------------+
| Care Team Member Name     | Role | Phone           |
+---------------------------+------+-----------------+
| Dominique Lara PA-C | PP   | [phone] |
+---------------------------+------+-----------------+
 
 
 
 Allergies
 Not on File
 
 Current Medications
 Not on file
 
 Active Problems
 Not on file
 
 Social History
 
 
+----------------+-------+-----------+--------+------+
| Tobacco Use    | Types | Packs/Day | Years  | Date |
|                |       |           | Used   |      |
+----------------+-------+-----------+--------+------+
| Never Assessed |       |           |        |      |
+----------------+-------+-----------+--------+------+
 
 
 
+------------------+---------------+
| Sex Assigned at  | Date Recorded |
| Birth            |               |
+------------------+---------------+
| Not on file      |               |
+------------------+---------------+
 
 
 
 Plan of Treatment
 Not on file
 
 Results
 Not on filefrom Last 3 Months
 
 Insurance
 
 
+-----------------+--------+-------------+------+-------+---------+
| Payer           | Benefi | Subscriber  | Type | Phone | Address |
|                 | t Plan | ID          |      |       |         |
|                 |  /     |             |      |       |         |
|                 | Group  |             |      |       |         |
+-----------------+--------+-------------+------+-------+---------+
| [employer] | ODS    | [id number]   |      |       |         |
|                 | HEALTH |             |      |       |         |
|                 |  PLAN  |             |      |       |         |
+-----------------+--------+-------------+------+-------+---------+
 
 
 
 
+----------------+--------+-------------+--------+-------------+---------------------+
| Guarantor Name | [employer] | Relation to | Date   | Phone       | Billing Address     |
|                | t Type |  Patient    | of     |             |                     |
|                |        |             | Birth  |             |                     |
+----------------+--------+-------------+--------+-------------+---------------------+
| SPENCER BRUNSON     | Person | Self        | / |   Home:     |   [address]  |
|                | [address]Fam |             | 1969   | [phone] | Ave  [place], [place]  |
|                | [place]    |             |        | [address]        | [address]               |
+----------------+--------+-------------+--------+-------------+---------------------+

## 2018-09-22 NOTE — XMS
Clinical Summary
  Created on: 2018
 
 Spencer Brunson
 External Reference #: [id number]
 : 69
 Sex: Male
 
 Demographics
 
 
+-----------------------+----------------------+
| Address               | [address] |
|                       | [place]  [address] |
+-----------------------+----------------------+
| Home Phone            | [phone]      |
+-----------------------+----------------------+
| Preferred Language    | Unknown              |
+-----------------------+----------------------+
| Marital Status        | Unknown              |
+-----------------------+----------------------+
| Jew Affiliation | Unknown              |
+-----------------------+----------------------+
| Race                  | Unknown              |
+-----------------------+----------------------+
| Ethnic Group          | Unknown              |
+-----------------------+----------------------+
 
 
 Author
 
 
+--------------+--------------------------------------------+
| Author       | [facility]  |
|              | and [place]                                |
+--------------+--------------------------------------------+
| Organization | [facility]  |
|              | and [place]                                |
+--------------+--------------------------------------------+
| Address      | Unknown                                    |
+--------------+--------------------------------------------+
| Phone        | Unavailable                                |
+--------------+--------------------------------------------+
 
 
 
 Care Team Providers
 
 
+-----------------------+------+-------------+
| Care Team Member Name | Role | Phone       |
+-----------------------+------+-------------+
 PP   | Unavailable |
+-----------------------+------+-------------+
 
 
 
 Allergies
 
 Not on File
 
 Current Medications
 Not on file
 
 Active Problems
 Not on file
 
 Social History
 
 
+----------------+-------+-----------+--------+------+
| Tobacco Use    | Types | Packs/Day | Years  | Date |
|                |       |           | Used   |      |
+----------------+-------+-----------+--------+------+
| Never Assessed |       |           |        |      |
+----------------+-------+-----------+--------+------+
 
 
 
+------------------+---------------+
| Sex Assigned at  | Date Recorded |
| Birth            |               |
+------------------+---------------+
| Not on file      |               |
+------------------+---------------+
 
 
 
 Plan of Treatment
 
 
+---------------------+-----------+-----------+----------+
| Health Maintenance  | Due Date  | Last Done | Comments |
+---------------------+-----------+-----------+----------+
| Vaccine:            |  |           |          |
| Dtap/Tdap/Td (1 -   | 8         |           |          |
| Tdap)               |           |           |          |
+---------------------+-----------+-----------+----------+
| Vaccine: Influenza  |  |           |          |
| (#1)                | 8         |           |          |
+---------------------+-----------+-----------+----------+
 
 
 
 Results
 Not on filefrom Last 3 Months

## 2018-09-22 NOTE — XMS
PreManage Notification: KYRIE KRAFT MRN:[id number]
 
Security Information
 
Security Events
No recent Security Events currently on file
 
 
 
CRITERIA MET
------------
- [facility] - 2 Visits in 30 Days
 
 
CARE PROVIDERS
-------------------------------------------------------------------------------------
BLAIR LARA       Student in an Organized Health Care       09/21/2018-Current
JOSH                Education/Training Program
 
PHONE: [phone]
-------------------------------------------------------------------------------------
Blair Lara     Primary Care     [facility]
 
PHONE: Unknown
-------------------------------------------------------------------------------------
 
Celeste has no Care Guidelines for this patient.
 
SUSAN VISIT COUNT (12 MO.)
-------------------------------------------------------------------------------------
[address] [place] [address]
-------------------------------------------------------------------------------------
TOTAL 4
-------------------------------------------------------------------------------------
NOTE: Visits indicate total known visits.
 
ED/UCC VISIT TRACKING (12 MO.)
-------------------------------------------------------------------------------------
09/22/2018 06:18
[facility] [place]
 
TYPE: Emergency
 
COMPLAINT:
- REQUEST ANTIBIOTIC SHOT
-------------------------------------------------------------------------------------
09/21/2018 07:41
[facility] [place]
 
TYPE: Emergency
 
COMPLAINT:
- EXTREMITY PAIN NON INJURY
-------------------------------------------------------------------------------------
09/20/2018 07:31
[facility] [place]
 
TYPE: Emergency
 
 
COMPLAINT:
- R ELBOW PAIN/SWELLING/NO INJURY
-------------------------------------------------------------------------------------
09/14/2018 11:10
[facility] [place]
 
TYPE: Emergency
 
COMPLAINT:
- R ELBOW PAIN/INJURY
-------------------------------------------------------------------------------------
 
 
INPATIENT VISIT TRACKING (12 MO.)
No inpatient visits to display in this time frame
 
[email or web address]

## 2018-09-22 NOTE — XMS
Clinical Summary
  Created on: 2018
 
 Spencer Brunson
 External Reference #: [id number]
 : 69
 Sex: Male
 
 Demographics
 
 
+-----------------------+----------------------+
| Address               | [address] |
|                       | [place]  [address] |
+-----------------------+----------------------+
| Home Phone            | [phone]      |
+-----------------------+----------------------+
| Preferred Language    | Unknown              |
+-----------------------+----------------------+
| Marital Status        | Single               |
+-----------------------+----------------------+
| Spiritism Affiliation | Unknown              |
+-----------------------+----------------------+
| Race                  | Unknown              |
+-----------------------+----------------------+
| Ethnic Group          | Unknown              |
+-----------------------+----------------------+
 
 
 Author
 
 
+--------------+-----------------------+
| Author       | [facility] |
+--------------+-----------------------+
| Organization | [facility] |
+--------------+-----------------------+
| Address      | Unknown               |
+--------------+-----------------------+
| Phone        | Unavailable           |
+--------------+-----------------------+
 
 
 
 Support
 
 
+------------+--------------+---------+-----------------+
| Name       | Relationship | Address | Phone           |
+------------+--------------+---------+-----------------+
| No,Contact | ECON         | Unknown | [phone] |
+------------+--------------+---------+-----------------+
 
 
 
 Care Team Providers
 
 
 
+---------------------------+------+-----------------+
| Care Team Member Name     | Role | Phone           |
+---------------------------+------+-----------------+
| Dominique Lara PA-C | PP   | [phone] |
+---------------------------+------+-----------------+
 
 
 
 Allergies
 Not on File
 
 Current Medications
 Not on file
 
 Active Problems
 Not on file
 
 Social History
 
 
+----------------+-------+-----------+--------+------+
| Tobacco Use    | Types | Packs/Day | Years  | Date |
|                |       |           | Used   |      |
+----------------+-------+-----------+--------+------+
| Never Assessed |       |           |        |      |
+----------------+-------+-----------+--------+------+
 
 
 
+------------------+---------------+
| Sex Assigned at  | Date Recorded |
| Birth            |               |
+------------------+---------------+
| Not on file      |               |
+------------------+---------------+
 
 
 
 Plan of Treatment
 Not on file
 
 Results
 Not on filefrom Last 3 Months
 
 Insurance
 
 
+-----------------+--------+-------------+------+-------+---------+
| Payer           | Benefi | Subscriber  | Type | Phone | Address |
|                 | t Plan | ID          |      |       |         |
|                 |  /     |             |      |       |         |
|                 | Group  |             |      |       |         |
+-----------------+--------+-------------+------+-------+---------+
| [employer] | [employer]    | [id number]   |      |       |         |
|                 | HEALTH |             |      |       |         |
|                 |  PLAN  |             |      |       |         |
+-----------------+--------+-------------+------+-------+---------+
 
 
 
 
+----------------+--------+-------------+--------+-------------+---------------------+
| Guarantor Name | [employer] | Relation to | Date   | Phone       | Billing Address     |
|                | t Type |  Patient    | of     |             |                     |
|                |        |             | Birth  |             |                     |
+----------------+--------+-------------+--------+-------------+---------------------+
| SPENCER BRUNSON     | Person | Self        | / |   Home:     |   [address]  |
|                | [address]am |             | 1969   | [phone] [address]  [place], [place]  |
|                | [place]    |             |        | [id number]        | [id number]               |
+----------------+--------+-------------+--------+-------------+---------------------+

## 2018-09-22 NOTE — XMS
Clinical Summary
  Created on: 2018
 
 Spencer Brunson
 External Reference #: [id number]
 : 69
 Sex: Male
 
 Demographics
 
 
+-----------------------+----------------------+
| Address               | [address] |
|                       | [place]  [address] |
+-----------------------+----------------------+
| Home Phone            | [phone]      |
+-----------------------+----------------------+
| Preferred Language    | Unknown              |
+-----------------------+----------------------+
| Marital Status        | Unknown              |
+-----------------------+----------------------+
| Rastafarian Affiliation | Unknown              |
+-----------------------+----------------------+
| Race                  | Unknown              |
+-----------------------+----------------------+
| Ethnic Group          | Unknown              |
+-----------------------+----------------------+
 
 
 Author
 
 
+--------------+--------------------------------------------+
| Author       | [facility]  |
|              | and [place]                                |
+--------------+--------------------------------------------+
| Organization | [facility]  |
|              | and [place]                                |
+--------------+--------------------------------------------+
| Address      | Unknown                                    |
+--------------+--------------------------------------------+
| Phone        | Unavailable                                |
+--------------+--------------------------------------------+
 
 
 
 Care Team Providers
 
 
+-----------------------+------+-------------+
| Care Team Member Name | Role | Phone       |
+-----------------------+------+-------------+
 PP   | Unavailable |
+-----------------------+------+-------------+
 
 
 
 Allergies
 
 Not on File
 
 Current Medications
 Not on file
 
 Active Problems
 Not on file
 
 Social History
 
 
+----------------+-------+-----------+--------+------+
| Tobacco Use    | Types | Packs/Day | Years  | Date |
|                |       |           | Used   |      |
+----------------+-------+-----------+--------+------+
| Never Assessed |       |           |        |      |
+----------------+-------+-----------+--------+------+
 
 
 
+------------------+---------------+
| Sex Assigned at  | Date Recorded |
| Birth            |               |
+------------------+---------------+
| Not on file      |               |
+------------------+---------------+
 
 
 
 Plan of Treatment
 
 
+---------------------+-----------+-----------+----------+
| Health Maintenance  | Due Date  | Last Done | Comments |
+---------------------+-----------+-----------+----------+
| Vaccine:            |  |           |          |
| Dtap/Tdap/Td (1 -   | 8         |           |          |
| Tdap)               |           |           |          |
+---------------------+-----------+-----------+----------+
| Vaccine: Influenza  |  |           |          |
| (#1)                | 8         |           |          |
+---------------------+-----------+-----------+----------+
 
 
 
 Results
 Not on filefrom Last 3 Months

## 2020-04-15 NOTE — NUR
PT VS AND CONDITION STABLE TODAY SINCE HIS ARRIVAL TO UNIT AND INTO ROOM ON
MED SURG. PT VERY PLEASANT, CALM AND COOPERATIVE. PRN NORCO GIVEN FOR RIGHT
ARM PAIN WHICH ADEQUATELY HELPED PAIN. ROOM AIR. SALINE LOCKED. STEADY ON
FEET INDEPENDENTLY. RIGHT ARM ELEVATED ON PILLOW TO HELP DECREASE SWELLING.
GAUZE COVERING ASPRIATION SITE ON RIGHT ELBOW- IT'S C/D/I. GENERALIZED
SCRATCHES AND SCABS OVER BODY, MOST SPECIFICALLY ON BUE AND BLE FROM RECENT
CASE OF SCABIES. PT STATES HIS TREATMENT FOR SCABIES HAS BEEN COMPLETED BUT HE
REMAINS ON CONTACT PRECAUTIONS IN CASE. LAST BM WAS YESTERDAY. PT HAS UPPER
DENTURES IN. PT HAS NO CURRENT QUESTIONS OR CONCERNS. CALL LIGHT WITHIN REACH.
CALLS APPROPRIATELY. WILL CONTINUE TO MONITOR. 